# Patient Record
Sex: FEMALE | Race: WHITE | NOT HISPANIC OR LATINO | Employment: FULL TIME | URBAN - METROPOLITAN AREA
[De-identification: names, ages, dates, MRNs, and addresses within clinical notes are randomized per-mention and may not be internally consistent; named-entity substitution may affect disease eponyms.]

---

## 2023-05-12 ENCOUNTER — APPOINTMENT (OUTPATIENT)
Dept: URGENT CARE | Facility: CLINIC | Age: 33
End: 2023-05-12

## 2023-05-12 ENCOUNTER — APPOINTMENT (OUTPATIENT)
Dept: LAB | Facility: CLINIC | Age: 33
End: 2023-05-12

## 2023-05-12 DIAGNOSIS — Z02.1 PHYSICAL EXAM, PRE-EMPLOYMENT: ICD-10-CM

## 2023-05-12 LAB — RUBV IGG SERPL IA-ACNC: 50.3 IU/ML

## 2023-05-13 LAB
MEV IGG SER QL IA: NORMAL
MUV IGG SER QL IA: NORMAL
VZV IGG SER QL IA: NORMAL

## 2023-05-17 LAB
GAMMA INTERFERON BACKGROUND BLD IA-ACNC: 0.04 IU/ML
M TB IFN-G BLD-IMP: NEGATIVE
M TB IFN-G CD4+ BCKGRND COR BLD-ACNC: -0.01 IU/ML
M TB IFN-G CD4+ BCKGRND COR BLD-ACNC: 0 IU/ML
MITOGEN IGNF BCKGRD COR BLD-ACNC: >10 IU/ML

## 2024-02-27 ENCOUNTER — OFFICE VISIT (OUTPATIENT)
Dept: INFECTIOUS DISEASES | Facility: CLINIC | Age: 34
End: 2024-02-27

## 2024-02-27 VITALS
TEMPERATURE: 97.5 F | DIASTOLIC BLOOD PRESSURE: 66 MMHG | OXYGEN SATURATION: 98 % | WEIGHT: 115 LBS | SYSTOLIC BLOOD PRESSURE: 116 MMHG | RESPIRATION RATE: 16 BRPM | HEART RATE: 88 BPM

## 2024-02-27 DIAGNOSIS — Z71.84 TRAVEL ADVICE ENCOUNTER: Primary | ICD-10-CM

## 2024-02-27 PROCEDURE — 99411 PREVENTIVE COUNSELING GROUP: CPT | Performed by: INTERNAL MEDICINE

## 2024-02-27 NOTE — PROGRESS NOTES
[facility]    Patient is traveling to countries or areas within countries where immunizations are required or recommended:   Yes      Patient states they will visit underdeveloped areas with poor sanitation Yes/No: Yes   Patient requires malaria prophylaxis Yes/No: Yes    No orders of the defined types were placed in this encounter.      Patient instructed how to take medications Yes/No: Yes  Patient warned about side effects Yes/No: Yes  Patient made aware of side effects of yellow fever vaccine including death, and accepts risks.  Patient declined Yes/No: No    Will come back for Hepatitis A, Typhoid IM, and Yellow Fever

## 2024-03-12 ENCOUNTER — CLINICAL SUPPORT (OUTPATIENT)
Dept: INFECTIOUS DISEASES | Facility: CLINIC | Age: 34
End: 2024-03-12

## 2024-03-12 VITALS
SYSTOLIC BLOOD PRESSURE: 110 MMHG | RESPIRATION RATE: 18 BRPM | OXYGEN SATURATION: 98 % | DIASTOLIC BLOOD PRESSURE: 68 MMHG | TEMPERATURE: 97.3 F | HEART RATE: 73 BPM | WEIGHT: 115.08 LBS

## 2024-03-12 DIAGNOSIS — Z23 NEED FOR HEPATITIS A IMMUNIZATION: Primary | ICD-10-CM

## 2024-03-12 DIAGNOSIS — Z23 NEED FOR IMMUNIZATION AGAINST TYPHOID: ICD-10-CM

## 2024-03-12 DIAGNOSIS — Z71.84 TRAVEL ADVICE ENCOUNTER: ICD-10-CM

## 2024-03-12 DIAGNOSIS — Z23 NEED FOR IMMUNIZATION AGAINST YELLOW FEVER: ICD-10-CM

## 2024-03-12 PROCEDURE — 90632 HEPA VACCINE ADULT IM: CPT

## 2024-03-12 PROCEDURE — 90717 YELLOW FEVER VACCINE SUBQ: CPT

## 2024-03-12 PROCEDURE — 90472 IMMUNIZATION ADMIN EACH ADD: CPT

## 2024-03-12 PROCEDURE — 90471 IMMUNIZATION ADMIN: CPT

## 2024-03-12 PROCEDURE — 99211 OFF/OP EST MAY X REQ PHY/QHP: CPT

## 2024-03-12 PROCEDURE — 90691 TYPHOID VACCINE IM: CPT

## 2024-04-11 ENCOUNTER — OFFICE VISIT (OUTPATIENT)
Dept: OBGYN CLINIC | Facility: CLINIC | Age: 34
End: 2024-04-11
Payer: COMMERCIAL

## 2024-04-11 VITALS
HEIGHT: 59 IN | DIASTOLIC BLOOD PRESSURE: 80 MMHG | WEIGHT: 116 LBS | BODY MASS INDEX: 23.39 KG/M2 | SYSTOLIC BLOOD PRESSURE: 124 MMHG

## 2024-04-11 DIAGNOSIS — N80.9 ENDOMETRIOSIS: ICD-10-CM

## 2024-04-11 DIAGNOSIS — Z12.4 SCREENING FOR MALIGNANT NEOPLASM OF THE CERVIX: ICD-10-CM

## 2024-04-11 DIAGNOSIS — Z01.419 WOMEN'S ANNUAL ROUTINE GYNECOLOGICAL EXAMINATION: Primary | ICD-10-CM

## 2024-04-11 PROCEDURE — G0124 SCREEN C/V THIN LAYER BY MD: HCPCS | Performed by: PATHOLOGY

## 2024-04-11 PROCEDURE — G0476 HPV COMBO ASSAY CA SCREEN: HCPCS | Performed by: OBSTETRICS & GYNECOLOGY

## 2024-04-11 PROCEDURE — G0145 SCR C/V CYTO,THINLAYER,RESCR: HCPCS | Performed by: PATHOLOGY

## 2024-04-11 PROCEDURE — S0610 ANNUAL GYNECOLOGICAL EXAMINA: HCPCS | Performed by: OBSTETRICS & GYNECOLOGY

## 2024-04-11 NOTE — PROGRESS NOTES
"Subjective      Argenis Hicks is a 34 y.o. female who presents for annual well woman exam. Periods are regular every 28-30 days, lasting 4 days. No intermenstrual bleeding, spotting, or discharge.    Current contraception: none  History of abnormal Pap smear: no  Family history of uterine or ovarian cancer: no  Family history of breast cancer: no    Menstrual History:  OB History          0    Para   0    Term   0       0    AB   0    Living   0         SAB   0    IAB   0    Ectopic   0    Multiple   0    Live Births   0                  Patient's last menstrual period was 2024.  Period Duration (Days): 3-4 days  Period Pattern: Regular  Menstrual Flow: Heavy, Light  Dysmenorrhea: (!) Severe  Dysmenorrhea Symptoms: Cramping, Throbbing, Nausea, Diarrhea, Headache, Other (Comment) (back pain, constipation)    The following portions of the patient's history were reviewed and updated as appropriate: allergies, current medications, past family history, past medical history, past social history, past surgical history, and problem list.    Review of Systems  Review of Systems   Constitutional:  Negative for activity change, appetite change, chills, fatigue and fever.   Respiratory:  Negative for apnea, cough, chest tightness and shortness of breath.    Cardiovascular:  Negative for chest pain, palpitations and leg swelling.   Gastrointestinal:  Negative for abdominal pain, constipation, diarrhea, nausea and vomiting.   Genitourinary:  Negative for difficulty urinating, dysuria, flank pain, frequency, hematuria and urgency.   Neurological:  Negative for dizziness, seizures, syncope, light-headedness, numbness and headaches.   Psychiatric/Behavioral:  Negative for agitation and confusion.           Objective      /80 (BP Location: Left arm, Patient Position: Sitting, Cuff Size: Adult)   Ht 4' 11\" (1.499 m)   Wt 52.6 kg (116 lb)   LMP 2024   BMI 23.43 kg/m²     Physical Exam  OBGyn Exam "     General:   alert and oriented, in no acute distress, alert, appears stated age, and cooperative   Heart: regular rate and rhythm, S1, S2 normal, no murmur, click, rub or gallop   Lungs: clear to auscultation bilaterally   Abdomen: soft, non-tender, without masses or organomegaly   Vulva: normal, mild vaginismus    Vagina: normal mucosa, normal discharge   Cervix: no cervical motion tenderness and no lesions   Uterus: normal size   Adnexa:  Breast Exam:  No mass, milD RLQ tenderness (ovualting )  breasts appear normal, no suspicious masses, no skin or nipple changes or axillary nodes.                Assessment      @well woman@ .  34-year-old female  Annual exam  History of endometriosis  Currently not sexually active  Prior 4 laparoscopy for endometriosis and ovarian cyst  Mild vaginismus  Plan   Pap/HPV  Diet/exercise  Calcium/vitamin D      Management option for endometriosis reviewed and discussed with patient patient trying Nexplanon has side effect OCP has side effect, declined any further surgery not indicated at this time  Option given for Mirena IUD, continuous OCP, [place], if symptoms started to affect quality of life otherwise to continue NSAIDs as needed for pain all patient questions answered patient was satisfied      There are no Patient Instructions on file for this visit.

## 2024-04-12 LAB
HPV HR 12 DNA CVX QL NAA+PROBE: POSITIVE
HPV16 DNA CVX QL NAA+PROBE: NEGATIVE
HPV18 DNA CVX QL NAA+PROBE: NEGATIVE

## 2024-04-18 LAB
LAB AP GYN PRIMARY INTERPRETATION: ABNORMAL
Lab: ABNORMAL
PATH INTERP SPEC-IMP: ABNORMAL

## 2024-04-25 ENCOUNTER — TELEPHONE (OUTPATIENT)
Age: 34
End: 2024-04-25

## 2024-04-25 NOTE — TELEPHONE ENCOUNTER
Liquid based pap screening and HPV results have been finalized. Patient is calling in for results, however I do not see that the provider has interpreted these results. Contact number on file.

## 2024-06-07 ENCOUNTER — PROCEDURE VISIT (OUTPATIENT)
Dept: OBGYN CLINIC | Facility: CLINIC | Age: 34
End: 2024-06-07
Payer: COMMERCIAL

## 2024-06-07 VITALS
BODY MASS INDEX: 23.18 KG/M2 | WEIGHT: 115 LBS | SYSTOLIC BLOOD PRESSURE: 110 MMHG | DIASTOLIC BLOOD PRESSURE: 58 MMHG | HEIGHT: 59 IN

## 2024-06-07 DIAGNOSIS — R87.810 CERVICAL HIGH RISK HPV (HUMAN PAPILLOMAVIRUS) TEST POSITIVE: ICD-10-CM

## 2024-06-07 DIAGNOSIS — R87.612 LGSIL ON PAP SMEAR OF CERVIX: Primary | ICD-10-CM

## 2024-06-07 PROCEDURE — 57454 BX/CURETT OF CERVIX W/SCOPE: CPT | Performed by: OBSTETRICS & GYNECOLOGY

## 2024-06-07 PROCEDURE — 88305 TISSUE EXAM BY PATHOLOGIST: CPT | Performed by: STUDENT IN AN ORGANIZED HEALTH CARE EDUCATION/TRAINING PROGRAM

## 2024-06-07 RX ORDER — AZELAIC ACID 0.15 G/G
1 GEL TOPICAL 2 TIMES DAILY
COMMUNITY
Start: 2024-04-30

## 2024-06-07 NOTE — PROGRESS NOTES
"Assessment/Plan:     There are no diagnoses linked to this encounter.      34-year-old female  History of endometriosis  Currently N/A,  Prior 4 laparoscopy for endometriosis and ovarian cyst  Mild vaginismus  Pap LGSIL/HPV positive  Plan  Colposcopy ECC and biopsy at 1/6  Will call patient with results  Return to office for annual exam      Subjective:      Patient ID: Argenis Hicks is a 34 y.o. female.    HPI  Patient seen evaluated present to the office today for colposcopy secondary to abnormal Pap smear LGSIL/HPV positive  Procedure explained and discussed with patient all patient questions answered patient satisfied    The following portions of the patient's history were reviewed and updated as appropriate: allergies, current medications, past family history, past medical history, past social history, past surgical history and problem list.    Review of Systems      Objective:      /58 (BP Location: Left arm, Patient Position: Sitting, Cuff Size: Adult)   Ht 4' 11\" (1.499 m)   Wt 52.2 kg (115 lb)   BMI 23.23 kg/m²          Physical Exam       Colposcopy     Date/Time  6/7/2024 12:00 PM     Universal Protocol   Consent: Verbal consent obtained.  Risks and benefits: risks, benefits and alternatives were discussed  Consent given by: patient  Time out: Immediately prior to procedure a \"time out\" was called to verify the correct patient, procedure, equipment, support staff and site/side marked as required.  Patient understanding: patient states understanding of the procedure being performed  Patient consent: the patient's understanding of the procedure matches consent given  Procedure consent: procedure consent matches procedure scheduled  Patient identity confirmed: verbally with patient     Performed by  Natalio Bennett MD   Authorized by  aNtalio Bennett MD     Pre-procedure details      Pre-procedure timeout performed: yes      Prepped with: acetic acid     Indication    LSIL   Procedure Details "   Procedure: Colposcopy w/ cervical biopsy and ECC      Under satisfactory analgesia the patient was prepped and draped in the dorsal lithotomy position: yes      [place] speculum was placed in the vagina: yes      Under colposcopic examination the transition zone was seen in entirety: yes      Endocervix was curetted using a Kevorkian curette: yes      Monsel's solution was applied: yes      Biopsy(s): yes      Location:  1/6    Specimen to pathology: yes     Post-procedure      Findings: White epithelium      Patient tolerance of procedure:  Tolerated well, no immediate complications

## 2024-06-12 PROCEDURE — 88305 TISSUE EXAM BY PATHOLOGIST: CPT | Performed by: STUDENT IN AN ORGANIZED HEALTH CARE EDUCATION/TRAINING PROGRAM

## 2024-08-08 ENCOUNTER — APPOINTMENT (OUTPATIENT)
Dept: LAB | Facility: CLINIC | Age: 34
End: 2024-08-08

## 2024-08-08 DIAGNOSIS — Z00.8 OTHER SPECIFIED GENERAL MEDICAL EXAMINATION: ICD-10-CM

## 2024-08-08 LAB
CHOLEST SERPL-MCNC: 211 MG/DL
EST. AVERAGE GLUCOSE BLD GHB EST-MCNC: 105 MG/DL
HBA1C MFR BLD: 5.3 %
HDLC SERPL-MCNC: 72 MG/DL
LDLC SERPL CALC-MCNC: 126 MG/DL (ref 0–100)
NONHDLC SERPL-MCNC: 139 MG/DL
TRIGL SERPL-MCNC: 63 MG/DL

## 2024-08-08 PROCEDURE — 80061 LIPID PANEL: CPT

## 2024-08-08 PROCEDURE — 36415 COLL VENOUS BLD VENIPUNCTURE: CPT

## 2024-08-08 PROCEDURE — 83036 HEMOGLOBIN GLYCOSYLATED A1C: CPT

## 2024-11-01 ENCOUNTER — HOSPITAL ENCOUNTER (EMERGENCY)
Facility: HOSPITAL | Age: 34
Discharge: HOME | End: 2024-11-01
Attending: EMERGENCY MEDICINE
Payer: OTHER MISCELLANEOUS

## 2024-11-01 VITALS
HEART RATE: 68 BPM | TEMPERATURE: 98 F | RESPIRATION RATE: 18 BRPM | OXYGEN SATURATION: 100 % | SYSTOLIC BLOOD PRESSURE: 146 MMHG | DIASTOLIC BLOOD PRESSURE: 85 MMHG

## 2024-11-01 DIAGNOSIS — Z77.21 EXPOSURE TO BLOOD OR BODY FLUID: Primary | ICD-10-CM

## 2024-11-01 LAB
ALT SERPL W P-5'-P-CCNC: 14 U/L (ref 7–52)
HBV SURFACE AB SER-ACNC: >500 MIU/ML
HCV AB SER QL: NORMAL
HIV 1+2 AB+HIV1 P24 AG SERPL QL IA: NORMAL
HIV 2 AB SERPL QL IA: NORMAL
HIV1 AB SERPL QL IA: NORMAL
HIV1 P24 AG SERPL QL IA: NORMAL

## 2024-11-01 PROCEDURE — 36415 COLL VENOUS BLD VENIPUNCTURE: CPT | Performed by: EMERGENCY MEDICINE

## 2024-11-01 PROCEDURE — 86706 HEP B SURFACE ANTIBODY: CPT | Performed by: EMERGENCY MEDICINE

## 2024-11-01 PROCEDURE — 84460 ALANINE AMINO (ALT) (SGPT): CPT | Performed by: EMERGENCY MEDICINE

## 2024-11-01 PROCEDURE — 99284 EMERGENCY DEPT VISIT MOD MDM: CPT | Performed by: EMERGENCY MEDICINE

## 2024-11-01 PROCEDURE — 86803 HEPATITIS C AB TEST: CPT | Performed by: EMERGENCY MEDICINE

## 2024-11-01 PROCEDURE — 99283 EMERGENCY DEPT VISIT LOW MDM: CPT

## 2024-11-01 PROCEDURE — 87389 HIV-1 AG W/HIV-1&-2 AB AG IA: CPT | Performed by: EMERGENCY MEDICINE

## 2024-11-01 PROCEDURE — 87340 HEPATITIS B SURFACE AG IA: CPT | Performed by: EMERGENCY MEDICINE

## 2024-11-01 NOTE — ED PROVIDER NOTES
Emergency Department Employee Health Note  Argenis Hicks 34 y.o. female MRN: [id number]  Unit/Bed#: ED 22/ED 22 Encounter: [id number]        History of Present Illness     Chief Complaint:   Chief Complaint   Patient presents with   • Body Fluid Exposure     Exposure to patient fluid      HPI:  Argenis Hicks is a 34 y.o. female who presents with ***.  Mechanism:           HPI  Review of Systems    Historical Information     Immunizations:   Immunization History   Administered Date(s) Administered   • Hep A, adult 03/12/2024   • Hep B, adult 07/21/2011, 08/23/2011, 01/26/2012   • IPV 02/18/2015   • Meningococcal MCV4P 02/25/2008   • Tdap 06/17/2016   • Tuberculin Skin Test-PPD Intradermal 06/14/2016   • Typhoid, Unspecified 02/18/2015   • Typhoid, ViCPs 03/12/2024   • Yellow Fever 03/12/2024       Past Medical History:   Diagnosis Date   • Endometriosis 2005   • Migraine headache        Family History   Problem Relation Age of Onset   • Diabetes Father    • Heart disease Paternal Grandfather      History reviewed. No pertinent surgical history.  Social History     Tobacco Use   • Smoking status: Never   • Smokeless tobacco: Never   Vaping Use   • Vaping status: Never Used   Substance Use Topics   • Alcohol use: Yes     Comment: occasional   • Drug use: Never     E-Cigarette/Vaping   • E-Cigarette Use Never User      E-Cigarette/Vaping Substances   • Nicotine No    • THC No    • CBD No    • Flavoring No    • Other No    • Unknown No          Meds/Allergies   Prior to Admission Medications   Prescriptions Last Dose Informant Patient Reported? Taking?   Azelaic Acid 15 % cream   Yes No   Sig: Apply 1 Application topically 2 (two) times a day      Facility-Administered Medications: None       Allergies   Allergen Reactions   • Gluten Meal - Food Allergy GI Intolerance       PHYSICAL EXAM  Physical Exam    Vital Signs  ED Triage Vitals [11/01/24 1701]   Temperature Pulse Respirations Blood Pressure SpO2   98 °F (36.7 °C)  "68 18 146/85 100 %      Temp src Heart Rate Source Patient Position - Orthostatic VS BP Location FiO2 (%)   -- -- -- -- --      Pain Score       No Pain           Vitals:    11/01/24 1701   BP: 146/85   Pulse: 68         Certification of Exposure:    (link to Employee Health Services: [facility] ([email or web address]): find link to BBP Exposure Instructions under important links on center of the page)    The patient is stable and has a history and physical exam consistent with an Blood Exposure and based on my assessment this exposure is Significant     If “NonSignificant” nothing further needs to be filled out.  If \"Significant\" please continue with the following questions    For Significant Exposures All of the following items must be completed:     Source Patient Name: Oliver Mcguire  Source Patient MRN: [id number]     Was the Source Patient's Provider contacted Yes, Providers name: Dr. Lorenzo  Was \"Exposure Panel - Source\" ordered (including Rapid HIV, HBsAg and anti-HCV) for Source Patient: Yes    Exposure Information    Type of Body Fluid Exposure: blood    Estimated volume of fluid: small up to 5cc     Exposure area:  intact skin and mouth (mucosa)    Skin Integrity: intact    ED provider should review source patient chart for known history of HIV, Hepatitis B and Hepatitis C infection    Source patient HIV positive: No  Was the On-call Infectious Disease Provider contacted: No  Discussed treatment options with/for employee: Yes    Immunization History   Administered Date(s) Administered   • Hep A, adult 03/12/2024   • Hep B, adult 07/21/2011, 08/23/2011, 01/26/2012   • IPV 02/18/2015   • Meningococcal MCV4P 02/25/2008   • Tdap 06/17/2016   • Tuberculin Skin Test-PPD Intradermal 06/14/2016   • Typhoid, Unspecified 02/18/2015   • Typhoid, ViCPs 03/12/2024   • Yellow Fever 03/12/2024       Hepatitis B Vaccine History:    Immunization History   Administered Date(s) Administered   • Hep A, adult " "03/12/2024   • Hep B, adult 07/21/2011, 08/23/2011, 01/26/2012   • IPV 02/18/2015   • Meningococcal MCV4P 02/25/2008   • Tdap 06/17/2016   • Tuberculin Skin Test-PPD Intradermal 06/14/2016   • Typhoid, Unspecified 02/18/2015   • Typhoid, ViCPs 03/12/2024   • Yellow Fever 03/12/2024       The patient has Previously been vaccinated for Hepatitis B.     HEPATITIS B IMMUNE GLOBULIN:  Not Indicated    Tetanus Vaccine History:    TDAP vaccination date: 6/17/2016    The patient has Tdap reported as up to date    Employee/Patient post exposure testing Has been performed.     \"Exposure Panel - Employee Baseline\"  (includes HBsAg, HBsAb, anti-HCV, ALT, HIV) with verbal consent and pretesting counseling for the patient Has been ordered.    Post-exposure Prophylaxis treatment options were discussed today: Not Indicated      Visual Acuity      ED Medications  Medications - No data to display    Diagnostic Studies  Results Reviewed       Procedure Component Value Units Date/Time    Hepatitis C antibody [[id number]]     Lab Status: No result Specimen: Blood     HIV 1/2 AG/AB w Reflex [facility] for 2 yr old and above [[id number]]     Lab Status: No result Specimen: Blood     ALT [[id number]]     Lab Status: No result Specimen: Blood     Hepatitis B surface antibody [[id number]]     Lab Status: No result Specimen: Blood     Hepatitis B surface antigen [[id number]]     Lab Status: No result Specimen: Blood                No orders to display              Procedures  Procedures         Medical Decision Making  Amount and/or Complexity of Data Reviewed  Labs: ordered.        Disposition  Final diagnoses:   None     ED Disposition       None          Follow-up Information    None         Patient's Medications   Discharge Prescriptions    No medications on file       No discharge procedures on file.    PDMP Review       None              ED Provider  Electronically Signed by            " Studies  Results Reviewed       Procedure Component Value Units Date/Time    Hepatitis B surface antigen [[id number]]  (Normal) Collected: 11/01/24 1749    Lab Status: Final result Specimen: Blood from Arm, Right Updated: 11/02/24 0021     Hepatitis B Surface Ag Non-reactive    Hepatitis C antibody [[id number]]  (Normal) Collected: 11/01/24 1749    Lab Status: Final result Specimen: Blood from Arm, Right Updated: 11/01/24 2303     Hepatitis C Ab Non-reactive    Hepatitis B surface antibody [[id number]] Collected: 11/01/24 1749    Lab Status: Final result Specimen: Blood from Arm, Right Updated: 11/01/24 2303     Hep B S Ab >500.00 mIU/mL     HIV 1/2 AG/AB w Reflex [facility] for 2 yr old and above [[id number]]  (Normal) Collected: 11/01/24 1749    Lab Status: Final result Specimen: Blood from Arm, Right Updated: 11/01/24 2142     HIV-1 p24 Antigen Non-Reactive     HIV-1 Antibody Non-Reactive     HIV-2 Antibody Non-Reactive     HIV Ag-Ab 5th Gen Non-Reactive    Narrative:      This 5th generation HIV Ag-Ab assay is a multiplex flow immunoassay intended for qualitative detection and differentiation of HIV-1 p24 antigen, HIV-1 (groups M and O) antibodies, and HIV-2 antibodies in human serum.  This assay is intended as an aid in the diagnosis of infection with HIV-1 and/or HIV-2, including acute (primary) HIV-1 infection. The test is validated for adult patients, including pregnant women, and in pediatric patients as young as two years of age. The performance of this assay has not been established for neonates.      ALT [[id number]]  (Normal) Collected: 11/01/24 1749    Lab Status: Final result Specimen: Blood from Arm, Right Updated: 11/01/24 1817     ALT 14 U/L                No orders to display              Procedures  Procedures         Medical Decision Making  Amount and/or Complexity of Data Reviewed  Labs: ordered.        Disposition  Final diagnoses:   Exposure to blood or body fluid     Time reflects when diagnosis was  documented in both MDM as applicable and the Disposition within this note       Time User Action Codes Description Comment    11/1/2024  5:30 PM Chris Urbano Add [Z77.21] Exposure to blood or body fluid           ED Disposition       ED Disposition   Discharge    Condition   Stable    Date/Time   Fri Nov 1, 2024  5:30 PM    Comment   Argenis Hicks discharge to home/self care.                   Follow-up Information       Follow up With Specialties Details Why Contact [place] [address]  [phone]            Discharge Medication List as of 11/1/2024  5:30 PM        CONTINUE these medications which have NOT CHANGED    Details   Azelaic Acid 15 % cream Apply 1 Application topically 2 (two) times a day, Starting Tue 4/30/2024, Historical Med             No discharge procedures on file.    PDMP Review       None              ED Provider  Electronically Signed by               Chris Urbano MD  11/05/24 6691

## 2024-11-02 LAB — HBV SURFACE AG SER QL: NORMAL

## 2025-03-31 DIAGNOSIS — Z20.1 EXPOSURE TO TB: Primary | ICD-10-CM

## 2025-04-01 ENCOUNTER — APPOINTMENT (OUTPATIENT)
Dept: LAB | Facility: CLINIC | Age: 35
End: 2025-04-01

## 2025-04-01 DIAGNOSIS — Z20.1 EXPOSURE TO TB: ICD-10-CM

## 2025-04-01 PROCEDURE — 86480 TB TEST CELL IMMUN MEASURE: CPT

## 2025-04-01 PROCEDURE — 36415 COLL VENOUS BLD VENIPUNCTURE: CPT

## 2025-04-02 LAB
GAMMA INTERFERON BACKGROUND BLD IA-ACNC: 0.01 IU/ML
M TB IFN-G BLD-IMP: NEGATIVE
M TB IFN-G CD4+ BCKGRND COR BLD-ACNC: 0 IU/ML
M TB IFN-G CD4+ BCKGRND COR BLD-ACNC: 0 IU/ML
MITOGEN IGNF BCKGRD COR BLD-ACNC: 7.49 IU/ML

## 2025-05-23 DIAGNOSIS — Z20.1 EXPOSURE TO TB: Primary | ICD-10-CM

## 2025-06-04 ENCOUNTER — APPOINTMENT (OUTPATIENT)
Dept: LAB | Facility: CLINIC | Age: 35
End: 2025-06-04
Attending: PHYSICIAN ASSISTANT

## 2025-06-04 DIAGNOSIS — Z20.1 EXPOSURE TO TB: ICD-10-CM

## 2025-06-04 PROCEDURE — 86480 TB TEST CELL IMMUN MEASURE: CPT

## 2025-06-04 PROCEDURE — 36415 COLL VENOUS BLD VENIPUNCTURE: CPT

## 2025-06-05 LAB
GAMMA INTERFERON BACKGROUND BLD IA-ACNC: 0.04 IU/ML
M TB IFN-G BLD-IMP: NEGATIVE
M TB IFN-G CD4+ BCKGRND COR BLD-ACNC: 0 IU/ML
M TB IFN-G CD4+ BCKGRND COR BLD-ACNC: 0.01 IU/ML
MITOGEN IGNF BCKGRD COR BLD-ACNC: 5.55 IU/ML